# Patient Record
Sex: MALE | Race: BLACK OR AFRICAN AMERICAN | URBAN - METROPOLITAN AREA
[De-identification: names, ages, dates, MRNs, and addresses within clinical notes are randomized per-mention and may not be internally consistent; named-entity substitution may affect disease eponyms.]

---

## 2018-07-19 ENCOUNTER — ED HISTORICAL/CONVERTED ENCOUNTER (OUTPATIENT)
Dept: OTHER | Age: 53
End: 2018-07-19

## 2023-02-24 ENCOUNTER — HOSPITAL ENCOUNTER (EMERGENCY)
Age: 58
Discharge: HOME | End: 2023-02-24
Attending: EMERGENCY MEDICINE
Payer: COMMERCIAL

## 2023-02-24 VITALS
HEART RATE: 96 BPM | RESPIRATION RATE: 18 BRPM | HEIGHT: 67 IN | WEIGHT: 146 LBS | DIASTOLIC BLOOD PRESSURE: 76 MMHG | OXYGEN SATURATION: 99 % | BODY MASS INDEX: 22.91 KG/M2 | TEMPERATURE: 98.1 F | SYSTOLIC BLOOD PRESSURE: 107 MMHG

## 2023-02-24 DIAGNOSIS — T15.92XA FOREIGN BODY OF LEFT EYE, INITIAL ENCOUNTER: ICD-10-CM

## 2023-02-24 DIAGNOSIS — S05.02XA ABRASION OF LEFT CORNEA, INITIAL ENCOUNTER: Primary | ICD-10-CM

## 2023-02-24 PROCEDURE — 99283 EMERGENCY DEPT VISIT LOW MDM: CPT

## 2023-02-24 PROCEDURE — 74011000250 HC RX REV CODE- 250: Performed by: EMERGENCY MEDICINE

## 2023-02-24 RX ORDER — TETRACAINE HYDROCHLORIDE 5 MG/ML
1 SOLUTION OPHTHALMIC ONCE
Status: COMPLETED | OUTPATIENT
Start: 2023-02-24 | End: 2023-02-24

## 2023-02-24 RX ORDER — POLYMYXIN B SULFATE AND TRIMETHOPRIM 1; 10000 MG/ML; [USP'U]/ML
1 SOLUTION OPHTHALMIC EVERY 6 HOURS
Status: DISCONTINUED | OUTPATIENT
Start: 2023-02-24 | End: 2023-02-24 | Stop reason: HOSPADM

## 2023-02-24 RX ADMIN — FLUORESCEIN SODIUM 1 STRIP: 1 STRIP OPHTHALMIC at 19:20

## 2023-02-24 RX ADMIN — POLYMYXIN B SULFATE AND TRIMETHOPRIM 1 DROP: 10000; 1 SOLUTION OPHTHALMIC at 20:12

## 2023-02-24 RX ADMIN — TETRACAINE HYDROCHLORIDE 1 DROP: 5 SOLUTION OPHTHALMIC at 19:20

## 2023-02-24 NOTE — Clinical Note
[facility] EMERGENCY DEPT  [address] [place] [id number]  [phone]    Work/School Note    Date: 2/24/2023    To Whom It May concern:    Genesis Burgos was seen and treated today in the emergency room by the following provider(s):  Attending Provider: Anali Blanc is excused from work/school on 02/24/23 and 02/25/23. He is medically clear to return to work/school on 2/26/2023.        Sincerely,          Lucía Vigil, DO

## 2023-02-24 NOTE — ED TRIAGE NOTES
Pt states he was working and felt like he got something in his left eye. Flushed it and pain increased.

## 2023-02-27 ENCOUNTER — PATIENT OUTREACH (OUTPATIENT)
Dept: OTHER | Age: 58
End: 2023-02-27

## 2023-02-27 NOTE — ED PROVIDER NOTES
[facility] EMERGENCY DEPT  EMERGENCY DEPARTMENT HISTORY AND PHYSICAL EXAM      Date: 2/24/2023  Patient Name: David Kennedy  MRN: [id number]  YOB: 1965  Date of evaluation: 2/24/2023  Provider: Traci Jovel DO   Note Started: 8:02 AM 2/27/23    HISTORY OF PRESENT ILLNESS     Chief Complaint   Patient presents with    Eye Pain       History Provided By: Patient    HPI: David Kennedy, 56 y.o. male presented to the emergency department for evaluation of left eye irritation. Patient is an . States that he was emptying trash when something blew into his left eye. Reports no visual disturbance. States that he feels like there is something in the medial aspect of his left eye. Does endorse associated tearing. PAST MEDICAL HISTORY   Past Medical History:  No past medical history on file. Past Surgical History:  No past surgical history on file. Family History:  No family history on file. Social History: Allergies:  No Known Allergies    PCP: None    Current Meds:   There are no discharge medications for this patient. REVIEW OF SYSTEMS   Review of Systems   Constitutional:  Negative for chills and fever. HENT:  Negative for congestion and rhinorrhea. Eyes:  Positive for pain and discharge. Negative for photophobia and visual disturbance. Respiratory:  Negative for cough and shortness of breath. Cardiovascular:  Negative for chest pain and palpitations. Gastrointestinal:  Negative for abdominal pain, diarrhea, nausea and vomiting. Musculoskeletal:  Negative for arthralgias and myalgias. Skin:  Negative for color change and rash. Neurological:  Negative for weakness and headaches. Positives and Pertinent negatives as per HPI.     PHYSICAL EXAM     ED Triage Vitals [02/24/23 1842]   ED Encounter Vitals Group      /76      Pulse (Heart Rate) 96      Resp Rate 18      Temp 98.1 °F (36.7 °C)      Temp src       O2 Sat (%) 99 %      Weight 146 lb      Height 5' 7\"      Physical Exam  Constitutional:       General: He is not in acute distress. Appearance: Normal appearance. He is not ill-appearing. HENT:      Head: Normocephalic and atraumatic. Right Ear: External ear normal.      Left Ear: External ear normal.      Nose: Nose normal.      Mouth/Throat:      Mouth: Mucous membranes are moist.   Eyes:      General:         Left eye: Foreign body present. Extraocular Movements: Extraocular movements intact. Conjunctiva/sclera: Conjunctivae normal.      Pupils: Pupils are equal, round, and reactive to light. Left eye: Corneal abrasion (Not overlying the pupil. Nasal to the iris) and fluorescein uptake present. Jewell exam negative. Comments: Punctate, organic appearing material under the left lid   Cardiovascular:      Rate and Rhythm: Normal rate and regular rhythm. Pulses: Normal pulses. Pulmonary:      Effort: Pulmonary effort is normal. No respiratory distress. Breath sounds: Normal breath sounds. Abdominal:      General: Abdomen is flat. There is no distension. Musculoskeletal:         General: Normal range of motion. Cervical back: Normal range of motion. Skin:     General: Skin is warm and dry. Neurological:      General: No focal deficit present. Mental Status: He is alert and oriented to person, place, and time. Psychiatric:         Mood and Affect: Mood normal.         Behavior: Behavior normal.         Thought Content: Thought content normal.         Judgment: Judgment normal.       SCREENINGS               No data recorded      LAB, EKG AND DIAGNOSTIC RESULTS   Labs:  No results found for this or any previous visit (from the past 12 hour(s)). EKG: Initial EKG interpreted by me.  Shows     Radiologic Studies:  Non-plain film images such as CT, Ultrasound and MRI are read by the radiologist. Plain radiographic images are visualized and preliminarily interpreted by the ED Provider with the below findings:    *    Interpretation per the Radiologist below, if available at the time of this note:  No results found. PROCEDURES   Unless otherwise noted below, none. Performed by: Cj Bustillos, DO   Procedures      CRITICAL CARE TIME       ED COURSE and DIFFERENTIAL DIAGNOSIS/MDM   Vitals:    Vitals:    02/24/23 1842   BP: 107/76   Pulse: 96   Resp: 18   Temp: 98.1 °F (36.7 °C)   SpO2: 99%   Weight: 66.2 kg (146 lb)   Height: 5' 7\" (1.702 m)        Patient was given the following medications:  Medications   fluorescein (FUL-NIKIA) 1 mg ophthalmic strip 1 Strip (1 Strip Both Eyes Given by Provider 2/24/23 1920)   tetracaine HCl (PF) (PONTOCAINE) 0.5 % ophthalmic solution 1 Drop (1 Drop Both Eyes Given by Provider 2/24/23 1920)       CONSULTS: (Who and What was discussed)  None     Chronic Conditions: None  Social Determinants affecting Dx or Tx: None  Counseling:      Records Reviewed (source and summary of external notes): Nursing notes    CC/HPI Summary, DDx, ED Course, and Reassessment: 56-year-old male presenting to the emergency department for evaluation of left eye pain, tearing. States that he was working when he got something in his eye. On exam, there is small punctate foreign body beneath the left lid. There is associated fluorescein uptake consistent with a corneal abrasion to the nasal aspect of the left eye. There is no abrasion overlying the pupil. Jewell sign negative. Patient is not a contact lens user. Patient does report improvement of pain after instillation of tetracaine drops consistent with an anterior process. Will discharge home with antibiotic drops and instructions to follow-up with ophthalmology. Disposition Considerations (Tests not done, Shared Decision Making, Pt Expectation of Test or Treatment.):  No evidence of globe rupture, hyphema, hypopyon, iritis, glaucoma    FINAL IMPRESSION     1. Abrasion of left cornea, initial encounter    2.  Foreign body of left eye, initial encounter          DISPOSITION/PLAN   Discharged    Discharge Note: The patient is stable for discharge home. The signs, symptoms, diagnosis, and discharge instructions have been discussed, understanding conveyed, and agreed upon. The patient is to follow up as recommended or return to ER should their symptoms worsen. PATIENT REFERRED TO:  Follow-up Information       Follow up With Specialties Details Why [facility] EMERGENCY DEPT Emergency Medicine  As needed, If symptoms worsen [address] [facility] [phone] [id number] [facility]  Schedule an appointment as soon as possible for a visit in 1 week  [address] [place] [address] Drive [address] [address] [facility] [address]:  There are no discharge medications for this patient. DISCONTINUED MEDICATIONS:  There are no discharge medications for this patient. I am the Primary Clinician of Record: Ammon Rico DO (electronically signed)    (Please note that parts of this dictation were completed with voice recognition software. Quite often unanticipated grammatical, syntax, homophones, and other interpretive errors are inadvertently transcribed by the computer software. Please disregards these errors.  Please excuse any errors that have escaped final proofreading.)

## 2023-02-27 NOTE — PROGRESS NOTES
Initial HPRP:   Patient on report as discharged from [facility] ED Visit 2/24/23 for Abrasion of Left Cornea. Initial attempt to contact patient for transitions of care. Left discreet message on voicemail with this Care Coordinator's contact information. Next Outreach 2/28/23. f/u -?PCP, Eye Appointment      Call 911 anytime you think you may need emergency care. For example, call if:   You suddenly cannot see or can barely see. Call your doctor now or seek immediate medical care if:  You have signs of infection in the eye, such as:  Pus or thick discharge coming from the eye. Redness or swelling around the eye. A fever. You have new or increasing eye pain. It feels like sand is in your eye when you blink. You are not getting better after 1 day (24 hours).

## 2023-02-28 ENCOUNTER — PATIENT OUTREACH (OUTPATIENT)
Dept: OTHER | Age: 58
End: 2023-02-28

## 2023-02-28 NOTE — PROGRESS NOTES
Patient identified as eligible for [address] [place] [address] services. Second telephone outreach attempted. Left discreet voicemail with this CM confidential contact information. Will send UTR letter via Mail. Next Outreach 3/14/23 f/u - ? Eye Appointment.

## 2023-03-14 ENCOUNTER — PATIENT OUTREACH (OUTPATIENT)
Dept: OTHER | Age: 58
End: 2023-03-14

## 2023-03-14 NOTE — PROGRESS NOTES
HPRP f/u:  Telephone attempt to contact patient for Health Promotion and Risk Prevention. Left discreet message on voicemail with this CC contact information. Will follow for one month for transitions of care needs. Next outreach is 3/28/23 for discussion f/u - ? Eye Appointment. and Resolve Episode.

## 2023-03-28 ENCOUNTER — PATIENT OUTREACH (OUTPATIENT)
Dept: OTHER | Age: 58
End: 2023-03-28

## 2023-03-28 NOTE — LETTER
3/28/2023 4:29 PM    Mr. Stephanie Garcia  [address] [address]    Dear Stephanie Garcia    My name is Cori Meneses,  Associate Care Coordinator for [facility] [place], and I have been trying to reach you. The Associate Care Management (ACM) program is a free-of-charge, confidential service provided to our employees and their family members covered by the [facility]. I can help you with care transitions such as when you come home from the hospital, when help is needed to manage your disease, or when you need assistance coordinating services or appointments. As healthcare providers, we know that patients do better when they have close follow up with a primary care provider (PCP). I can help you find one that is convenient to you and covered by your insurance. I can also help you understand any after visit instructions, such as what symptoms to watch out for, or any new or changed medications. We can work together using your preferred communication -- telephone, email, MyChart. If you do not have a [employer] account, I can help you request access. Our program is designed to provide you with the opportunity to have a [facility] care manager partner with you for your healthcare needs. Due to not being able to reach you, I am closing out the current program, but will remain available to you should you have any questions. Please contact me at the below number if I can provide you with assistance for any of the above services.     Sincerely,    Gordy Maxwell LPN  [facility] Care Coordinator  Associate Care Management  [facility]  [address]  [place] [phone]   [phone]  [email or web address]